# Patient Record
Sex: MALE | Race: BLACK OR AFRICAN AMERICAN | NOT HISPANIC OR LATINO | Employment: UNEMPLOYED | URBAN - METROPOLITAN AREA
[De-identification: names, ages, dates, MRNs, and addresses within clinical notes are randomized per-mention and may not be internally consistent; named-entity substitution may affect disease eponyms.]

---

## 2021-03-16 ENCOUNTER — IMMUNIZATION (OUTPATIENT)
Dept: OBSTETRICS AND GYNECOLOGY | Facility: CLINIC | Age: 41
End: 2021-03-16
Payer: OTHER GOVERNMENT

## 2021-03-16 DIAGNOSIS — Z23 NEED FOR VACCINATION: Primary | ICD-10-CM

## 2021-03-16 PROCEDURE — 0001A COVID-19, MRNA, LNP-S, PF, 30 MCG/0.3 ML DOSE VACCINE: ICD-10-PCS | Mod: CV19,,, | Performed by: FAMILY MEDICINE

## 2021-03-16 PROCEDURE — 0001A COVID-19, MRNA, LNP-S, PF, 30 MCG/0.3 ML DOSE VACCINE: CPT | Mod: CV19,,, | Performed by: FAMILY MEDICINE

## 2021-03-16 PROCEDURE — 91300 COVID-19, MRNA, LNP-S, PF, 30 MCG/0.3 ML DOSE VACCINE: ICD-10-PCS | Mod: ,,, | Performed by: FAMILY MEDICINE

## 2021-03-16 PROCEDURE — 91300 COVID-19, MRNA, LNP-S, PF, 30 MCG/0.3 ML DOSE VACCINE: CPT | Mod: ,,, | Performed by: FAMILY MEDICINE

## 2021-04-06 ENCOUNTER — IMMUNIZATION (OUTPATIENT)
Dept: OBSTETRICS AND GYNECOLOGY | Facility: CLINIC | Age: 41
End: 2021-04-06
Payer: OTHER GOVERNMENT

## 2021-04-06 DIAGNOSIS — Z23 NEED FOR VACCINATION: Primary | ICD-10-CM

## 2021-04-06 PROCEDURE — 0002A COVID-19, MRNA, LNP-S, PF, 30 MCG/0.3 ML DOSE VACCINE: ICD-10-PCS | Mod: CV19,,, | Performed by: FAMILY MEDICINE

## 2021-04-06 PROCEDURE — 91300 COVID-19, MRNA, LNP-S, PF, 30 MCG/0.3 ML DOSE VACCINE: ICD-10-PCS | Mod: ,,, | Performed by: FAMILY MEDICINE

## 2021-04-06 PROCEDURE — 0002A COVID-19, MRNA, LNP-S, PF, 30 MCG/0.3 ML DOSE VACCINE: CPT | Mod: CV19,,, | Performed by: FAMILY MEDICINE

## 2021-04-06 PROCEDURE — 91300 COVID-19, MRNA, LNP-S, PF, 30 MCG/0.3 ML DOSE VACCINE: CPT | Mod: ,,, | Performed by: FAMILY MEDICINE

## 2024-07-19 ENCOUNTER — HOSPITAL ENCOUNTER (EMERGENCY)
Facility: OTHER | Age: 44
Discharge: HOME | End: 2024-07-19
Attending: EMERGENCY MEDICINE
Payer: COMMERCIAL

## 2024-07-19 VITALS
BODY MASS INDEX: 37.8 KG/M2 | HEART RATE: 65 BPM | OXYGEN SATURATION: 99 % | SYSTOLIC BLOOD PRESSURE: 176 MMHG | WEIGHT: 270 LBS | DIASTOLIC BLOOD PRESSURE: 92 MMHG | HEIGHT: 71 IN | TEMPERATURE: 98 F

## 2024-07-19 DIAGNOSIS — I10 HYPERTENSION: ICD-10-CM

## 2024-07-19 DIAGNOSIS — K80.50 BILIARY COLIC: ICD-10-CM

## 2024-07-19 DIAGNOSIS — R10.13 EPIGASTRIC PAIN: Primary | ICD-10-CM

## 2024-07-19 LAB
ALBUMIN SERPL BCP-MCNC: 4 G/DL (ref 3.5–5.2)
ALP SERPL-CCNC: 52 U/L (ref 55–135)
ALT SERPL W/O P-5'-P-CCNC: 22 U/L (ref 10–44)
ANION GAP SERPL CALC-SCNC: 8 MMOL/L (ref 8–16)
AST SERPL-CCNC: 20 U/L (ref 10–40)
BASOPHILS # BLD AUTO: 0.04 K/UL (ref 0–0.2)
BASOPHILS NFR BLD: 0.3 % (ref 0–1.9)
BILIRUB SERPL-MCNC: 0.4 MG/DL (ref 0.1–1)
BILIRUB UR QL STRIP: NEGATIVE
BUN SERPL-MCNC: 20 MG/DL (ref 6–20)
CALCIUM SERPL-MCNC: 9.5 MG/DL (ref 8.7–10.5)
CHLORIDE SERPL-SCNC: 104 MMOL/L (ref 95–110)
CLARITY UR: CLEAR
CO2 SERPL-SCNC: 25 MMOL/L (ref 23–29)
COLOR UR: YELLOW
CREAT SERPL-MCNC: 1.4 MG/DL (ref 0.5–1.4)
DIFFERENTIAL METHOD BLD: ABNORMAL
EOSINOPHIL # BLD AUTO: 0 K/UL (ref 0–0.5)
EOSINOPHIL NFR BLD: 0.1 % (ref 0–8)
ERYTHROCYTE [DISTWIDTH] IN BLOOD BY AUTOMATED COUNT: 12.4 % (ref 11.5–14.5)
EST. GFR  (NO RACE VARIABLE): >60 ML/MIN/1.73 M^2
GLUCOSE SERPL-MCNC: 131 MG/DL (ref 70–110)
GLUCOSE UR QL STRIP: NEGATIVE
HCT VFR BLD AUTO: 43.8 % (ref 40–54)
HCV AB SERPL QL IA: NEGATIVE
HGB BLD-MCNC: 14.8 G/DL (ref 14–18)
HGB UR QL STRIP: NEGATIVE
HIV 1+2 AB+HIV1 P24 AG SERPL QL IA: NEGATIVE
IMM GRANULOCYTES # BLD AUTO: 0.05 K/UL (ref 0–0.04)
IMM GRANULOCYTES NFR BLD AUTO: 0.4 % (ref 0–0.5)
KETONES UR QL STRIP: NEGATIVE
LEUKOCYTE ESTERASE UR QL STRIP: NEGATIVE
LIPASE SERPL-CCNC: 24 U/L (ref 4–60)
LYMPHOCYTES # BLD AUTO: 1.1 K/UL (ref 1–4.8)
LYMPHOCYTES NFR BLD: 8 % (ref 18–48)
MCH RBC QN AUTO: 31.6 PG (ref 27–31)
MCHC RBC AUTO-ENTMCNC: 33.8 G/DL (ref 32–36)
MCV RBC AUTO: 93 FL (ref 82–98)
MONOCYTES # BLD AUTO: 0.7 K/UL (ref 0.3–1)
MONOCYTES NFR BLD: 5.2 % (ref 4–15)
NEUTROPHILS # BLD AUTO: 11.7 K/UL (ref 1.8–7.7)
NEUTROPHILS NFR BLD: 86 % (ref 38–73)
NITRITE UR QL STRIP: NEGATIVE
NRBC BLD-RTO: 0 /100 WBC
OHS QRS DURATION: 92 MS
OHS QTC CALCULATION: 439 MS
PH UR STRIP: 6 [PH] (ref 5–8)
PLATELET # BLD AUTO: 218 K/UL (ref 150–450)
PMV BLD AUTO: 11 FL (ref 9.2–12.9)
POTASSIUM SERPL-SCNC: 4.4 MMOL/L (ref 3.5–5.1)
PROT SERPL-MCNC: 7.6 G/DL (ref 6–8.4)
PROT UR QL STRIP: ABNORMAL
RBC # BLD AUTO: 4.69 M/UL (ref 4.6–6.2)
SODIUM SERPL-SCNC: 137 MMOL/L (ref 136–145)
SP GR UR STRIP: >1.03 (ref 1–1.03)
URN SPEC COLLECT METH UR: ABNORMAL
UROBILINOGEN UR STRIP-ACNC: NEGATIVE EU/DL
WBC # BLD AUTO: 13.56 K/UL (ref 3.9–12.7)

## 2024-07-19 PROCEDURE — 85025 COMPLETE CBC W/AUTO DIFF WBC: CPT | Performed by: EMERGENCY MEDICINE

## 2024-07-19 PROCEDURE — 93010 ELECTROCARDIOGRAM REPORT: CPT | Mod: ,,, | Performed by: INTERNAL MEDICINE

## 2024-07-19 PROCEDURE — 99285 EMERGENCY DEPT VISIT HI MDM: CPT | Mod: 25

## 2024-07-19 PROCEDURE — 93005 ELECTROCARDIOGRAM TRACING: CPT

## 2024-07-19 PROCEDURE — 83690 ASSAY OF LIPASE: CPT | Performed by: EMERGENCY MEDICINE

## 2024-07-19 PROCEDURE — 81003 URINALYSIS AUTO W/O SCOPE: CPT | Performed by: EMERGENCY MEDICINE

## 2024-07-19 PROCEDURE — 87389 HIV-1 AG W/HIV-1&-2 AB AG IA: CPT | Performed by: EMERGENCY MEDICINE

## 2024-07-19 PROCEDURE — 80053 COMPREHEN METABOLIC PANEL: CPT | Performed by: EMERGENCY MEDICINE

## 2024-07-19 PROCEDURE — 86803 HEPATITIS C AB TEST: CPT | Performed by: EMERGENCY MEDICINE

## 2024-07-19 PROCEDURE — 25000003 PHARM REV CODE 250: Performed by: EMERGENCY MEDICINE

## 2024-07-19 RX ORDER — NIFEDIPINE 30 MG/1
60 TABLET, EXTENDED RELEASE ORAL
Status: COMPLETED | OUTPATIENT
Start: 2024-07-19 | End: 2024-07-19

## 2024-07-19 RX ADMIN — SACUBITRIL AND VALSARTAN 1 TABLET: 97; 103 TABLET, FILM COATED ORAL at 05:07

## 2024-07-19 RX ADMIN — NIFEDIPINE 60 MG: 30 TABLET, FILM COATED, EXTENDED RELEASE ORAL at 05:07

## 2024-07-19 NOTE — ED TRIAGE NOTES
Pt presents to ED w/ c/o epigastric abdominal pain that began a few hours ago. Pt reports some nausea, but not at this time. Pt denies any vomiting, diarrhea, fever, chills, SOB, chest pain. Pt has a hx of CHF and hypertension. AAOx4. NAD noted.

## 2024-07-19 NOTE — Clinical Note
"Rajan Chion" Steve was seen and treated in our emergency department on 7/19/2024.  He may return to work on 07/23/2024.       If you have any questions or concerns, please don't hesitate to call.      Lanie Culver RN    "

## 2024-07-19 NOTE — ED PROVIDER NOTES
Encounter Date: 7/19/2024       History     Chief Complaint   Patient presents with    Abdominal Pain     Reports epigastric pain that started tonight. Had similar episode in May, and was told he needs cholecystectomy after cleared by cardiology. Was at [facility] but system was down so he came to [facility]. Given IV 4mg morphine and 10mg IV metoprolol before discharge      44-year-old male with history of CHF (EF <20%), HTN, CKD presents to the ED after episode of recurrent upper abdominal pain.  He was at normal baseline, after getting off work of few hours prior to arrival sudden onset of epigastric and right upper quadrant pain with associated nausea but no emesis.  He had similar episode a few months ago and was seen at [facility], diagnosed with gallstones.  He was told he needs to have his gallbladder out but needed cardiac clearance 1st, but his appointment for his cardiologist is not for another few weeks.  He has had no episodes until tonight, he ate his normal diet with no clear precipitating factor.  No alcohol use, fevers, diarrhea, or other associated symptoms.  He has been doing well otherwise with no leg swelling or orthopnea, no chest pain or SOB; he takes Lasix as needed but has not needed it recently.  He initially went to [facility] ED and was given IV metoprolol for his elevated blood pressure, and a dose of IV morphine.  Since arrival here he has become completely asymptomatic, denies any current pain or nausea      Review of patient's allergies indicates:  No Known Allergies  Past Medical History:   Diagnosis Date    CHF (congestive heart failure)     Hypertension      History reviewed. No pertinent surgical history.  No family history on file.  Social History     Tobacco Use    Smoking status: Unknown     Review of Systems   Constitutional:  Negative for fever.   HENT:  Negative for congestion.    Eyes:  Negative for redness.   Respiratory:  Negative for shortness of breath.    Cardiovascular:   Negative for chest pain.   Gastrointestinal:  Positive for abdominal pain and nausea.   Genitourinary:  Negative for dysuria.   Skin:  Negative for rash.   Neurological:  Negative for headaches.   Psychiatric/Behavioral:  Negative for confusion.        Physical Exam     Initial Vitals [07/19/24 0306]   BP Pulse Resp Temp SpO2   (!) 209/120 67 18 98.3 °F (36.8 °C) 96 %      MAP       --         Physical Exam    Nursing note and vitals reviewed.  Constitutional: He appears well-developed and well-nourished. He is not diaphoretic. No distress.   HENT:   Head: Normocephalic and atraumatic.   Eyes: Conjunctivae are normal. No scleral icterus.   Neck: Neck supple.   Cardiovascular:  Normal rate, regular rhythm, normal heart sounds and intact distal pulses.           No murmur heard.  Pulmonary/Chest: Breath sounds normal. No respiratory distress. He has no wheezes. He has no rhonchi. He has no rales.   Abdominal: Abdomen is soft. There is no abdominal tenderness.   Patient located previous pain to epigastric and right upper quadrant, but no focal tenderness currently.  Negative Brown's sign. There is no rebound and no guarding.   Musculoskeletal:         General: No edema.      Cervical back: Neck supple.     Neurological: He is alert and oriented to person, place, and time.   Skin: Skin is warm and dry.   Psychiatric: He has a normal mood and affect.         ED Course   Procedures  Labs Reviewed   CBC W/ AUTO DIFFERENTIAL - Abnormal; Notable for the following components:       Result Value    WBC 13.56 (*)     MCH 31.6 (*)     Gran # (ANC) 11.7 (*)     Immature Grans (Abs) 0.05 (*)     Gran % 86.0 (*)     Lymph % 8.0 (*)     All other components within normal limits    Narrative:     Release to patient->Immediate  For upper or mid abdominal pain.   COMPREHENSIVE METABOLIC PANEL - Abnormal; Notable for the following components:    Glucose 131 (*)     Alkaline Phosphatase 52 (*)     All other components within normal  limits    Narrative:     Release to patient->Immediate  For upper or mid abdominal pain.   URINALYSIS, REFLEX TO URINE CULTURE - Abnormal; Notable for the following components:    Specific Gravity, UA >1.030 (*)     Protein, UA Trace (*)     All other components within normal limits    Narrative:     In and Out Cath as needed it patient unable to void  Specimen Source->Urine   HIV 1 / 2 ANTIBODY    Narrative:     Release to patient->Immediate  For upper or mid abdominal pain.   HEPATITIS C ANTIBODY    Narrative:     Release to patient->Immediate  For upper or mid abdominal pain.   LIPASE    Narrative:     Release to patient->Immediate  For upper or mid abdominal pain.     EKG Readings: (Independently Interpreted)   Normal sinus rhythm at rate 71, no acute ischemia or arrhythmia       Imaging Results               US Abdomen Limited (Final result)  Result time 07/19/24 07:26:17      Final result by Iam Alan MD (07/19/24 07:26:17)                   Impression:      This report was flagged in Epic as abnormal.    Cholelithiasis/sludge noting diffuse gallbladder wall thickening and gallbladder wall hyperemia.  Despite reportedly negative sonographic Brown sign, findings are concerning sonographically for acute cholecystitis.  Clinical correlation however is advised, HIDA scan could be performed for confirmation.    Somewhat heterogeneous appearance of the hepatic parenchyma, correlation with LFTs recommended.      Electronically signed by: Iam Alan MD  Date:    07/19/2024  Time:    07:26               Narrative:    EXAMINATION:  US ABDOMEN LIMITED    CLINICAL HISTORY:  RUQ pain, h/o gallstones;    TECHNIQUE:  Limited ultrasound of the right upper quadrant of the abdomen (including pancreas, liver, gallbladder, common bile duct, and spleen) was performed.    COMPARISON:  None.    FINDINGS:  The visualized portions of the pancreas are unremarkable.  The gallbladder is nondistended noting cholelithiasis  and sludge.  The gallbladder wall is thickened measuring up to 2 cm.  There is gallbladder wall hyperemia.  No pericholecystic fluid.  The common duct is not dilated measuring 0.4 cm.  The hepatic parenchyma is somewhat heterogeneous, correlation with LFTs recommended.  No ascites.  Sonographic Brown sign is negative.                                       Medications   NIFEdipine 24 hr tablet 60 mg (60 mg Oral Given 7/19/24 0522)   sacubitriL-valsartan  mg per tablet 1 tablet (1 tablet Oral Given 7/19/24 0540)     Medical Decision Making      44-year-old male with history of CHF (EF <20%), HTN, CKD presents to the ED after episode of recurrent upper abdominal pain.  He was at normal baseline when he had sudden onset of pain in his epigastric and right upper quadrant with associated nausea but no emesis, no clear precipitant, he ate his normal dinner tonight.  He had similar episode a few months ago, per chart review was seen at [facility] with ultrasound showing gallstones and gallbladder wall thickening concerning for cholecystitis; he was seen by surgery and HIDA scan showed no evidence for acute cholecystitis, so surgery advised him to get cardiac clearance prior to elective cholecystectomy, which has not happened yet.  He has had no episodes until [facility], was initially seen at [facility] and was treated with IV morphine 4 mg and metoprolol IV 10 mg for elevated blood pressure before leaving to come here due to issues with their computer system.  He is now completely asymptomatic.  On arrival patient with slightly elevated blood pressure but afebrile and resting comfortably.  He has no current abdominal tenderness, negative Brown's sign, no sign of CHF exacerbation or other concerning exam findings.  Differential diagnosis includes biliary colic, acute cholecystitis, pancreatitis, duodenitis.  Due to concern that patient has pain may be masked by morphine, will still get ultrasound and workup.      Initial labs  with WBC 13.5, no elevated LFTs/lipase, CR 1.4 consistent with previous baseline.  Ultrasound shows cholelithiasis/sludge with diffuse gallbladder wall thickening and hyperemia, with negative Brown's sign.  Radiology read mentions concern for acute cholecystitis, but is almost identical to ultrasound from 2 months ago when he had subsequent normal HIDA scan.  Patient did not have any recurrent pain or nausea, monitored in ED, no abdominal tenderness even 8 hours after he initially got morphine at other facility.  Discussed with Dr. Fabian who agree that patient is poor operative candidate at this time before cardiac clearance, and gallbladder wall hyperemia and thickening could be related to severe CHF, not definitely cholecystitis; she offered to evaluate patient and discussed surgical options.  However patient prefers to not pursue surgical management at this time until he sees his cardiologist, which is reasonable since he likely had exacerbation of biliary colic today, no other signs or symptoms to suggest acute cholecystitis or indication for emergent cholecystectomy.  Patient advised on gallbladder diet and return precautions for any recurrent or worsening pain suggestive of biliary obstruction or acute cholecystitis, has cardiology follow up in 2 weeks for clearance prior to outpatient surgery.        Amount and/or Complexity of Data Reviewed  Labs: ordered.  Radiology: ordered.    Risk  Prescription drug management.                                      Clinical Impression:  Final diagnoses:  [I10] Hypertension  [R10.13] Epigastric pain (Primary)  [K80.50] Biliary colic          ED Disposition Condition    Discharge Stable          ED Prescriptions    None       Follow-up Information       Follow up With Specialties Details Why Contact Info    [facility] - Emergency Dept Emergency Medicine Go to  If symptoms worsen [address] [facility] [address] [phone]             Fabio Barroso,  MD  07/19/24 4449